# Patient Record
Sex: FEMALE | Race: WHITE | NOT HISPANIC OR LATINO | URBAN - METROPOLITAN AREA
[De-identification: names, ages, dates, MRNs, and addresses within clinical notes are randomized per-mention and may not be internally consistent; named-entity substitution may affect disease eponyms.]

---

## 2020-02-05 ENCOUNTER — OFFICE (OUTPATIENT)
Dept: URBAN - METROPOLITAN AREA CLINIC 33 | Facility: CLINIC | Age: 45
End: 2020-02-05

## 2020-02-05 VITALS
HEIGHT: 63 IN | WEIGHT: 119 LBS | TEMPERATURE: 97.7 F | DIASTOLIC BLOOD PRESSURE: 67 MMHG | SYSTOLIC BLOOD PRESSURE: 105 MMHG | HEART RATE: 73 BPM

## 2020-02-05 DIAGNOSIS — R74.8 ABNORMAL LEVELS OF OTHER SERUM ENZYMES: ICD-10-CM

## 2020-02-05 DIAGNOSIS — Z80.0 FAMILY HISTORY OF MALIGNANT NEOPLASM OF DIGESTIVE ORGANS: ICD-10-CM

## 2020-02-05 DIAGNOSIS — Z12.11 ENCOUNTER FOR SCREENING FOR MALIGNANT NEOPLASM OF COLON: ICD-10-CM

## 2020-02-05 DIAGNOSIS — Z83.71 FAMILY HISTORY OF COLONIC POLYPS: ICD-10-CM

## 2020-02-05 PROCEDURE — 99244 OFF/OP CNSLTJ NEW/EST MOD 40: CPT

## 2020-02-05 NOTE — SERVICEHPINOTES
FREDY KAPADIA   is a   44   year old    female who is being seen in consultation at the request of   [facility]   for elevated liver enzymes (AST and ALT). She was first noted to have elevated AST and ALT in 2011. She had an U/S 2 years ago which was normal. She has had prior negative hepatitis testing (2017). She has not had any autoimmune workup. Her most recent labwork in October 2019 revealed a slight increase in ALT to 71 (from 60 in 2017) and a bigger increase in AST to 82 (from 47 from 2017). She is wondering if more recent elevation was due to strenuous exercise which was several days prior to blood tests. She denies any alcohol use. She denies any needle sticks, blood transfusions, or risky behaviors for hepatitis in past two years. No medications or OTC supplements. No history of thyroid issues. She denies any abdominal pain, nausea, vomiting, diarrhea, constipation, melena, rectal bleeding, loss of appetite, weight loss, heartburn, dysphagia, fevers/chills/night sweats. She is also wondering about a colonoscopy given her age and family history of polyps (mother) as well as CRC in maternal grandfather.

## 2020-02-11 LAB
ALPHA-1-ANTITRYPSIN QN: 136 MG/DL (ref 83–199)
ANA SCREEN, IFA, W/REFL TITER AND PATTERN: ANA SCREEN, IFA: NEGATIVE
CBC (INCLUDES DIFF/PLT): ABSOLUTE BAND NEUTROPHILS: NORMAL CELLS/UL
CBC (INCLUDES DIFF/PLT): ABSOLUTE BASOPHILS: 28 CELLS/UL (ref 0–200)
CBC (INCLUDES DIFF/PLT): ABSOLUTE BLASTS: NORMAL CELLS/UL
CBC (INCLUDES DIFF/PLT): ABSOLUTE EOSINOPHILS: 72 CELLS/UL (ref 15–500)
CBC (INCLUDES DIFF/PLT): ABSOLUTE LYMPHOCYTES: 2305 CELLS/UL (ref 850–3900)
CBC (INCLUDES DIFF/PLT): ABSOLUTE METAMYELOCYTES: NORMAL CELLS/UL
CBC (INCLUDES DIFF/PLT): ABSOLUTE MONOCYTES: 369 CELLS/UL (ref 200–950)
CBC (INCLUDES DIFF/PLT): ABSOLUTE MYELOCYTES: NORMAL CELLS/UL
CBC (INCLUDES DIFF/PLT): ABSOLUTE NEUTROPHILS: 2728 CELLS/UL (ref 1500–7800)
CBC (INCLUDES DIFF/PLT): ABSOLUTE NUCLEATED RBC: NORMAL CELLS/UL
CBC (INCLUDES DIFF/PLT): ABSOLUTE PROMYELOCYTES: NORMAL CELLS/UL
CBC (INCLUDES DIFF/PLT): BAND NEUTROPHILS: NORMAL %
CBC (INCLUDES DIFF/PLT): BASOPHILS: 0.5 %
CBC (INCLUDES DIFF/PLT): BLASTS: NORMAL %
CBC (INCLUDES DIFF/PLT): COMMENT(S): NORMAL
CBC (INCLUDES DIFF/PLT): EOSINOPHILS: 1.3 %
CBC (INCLUDES DIFF/PLT): HEMATOCRIT: 39.7 % (ref 35–45)
CBC (INCLUDES DIFF/PLT): HEMOGLOBIN: 13.3 G/DL (ref 11.7–15.5)
CBC (INCLUDES DIFF/PLT): LYMPHOCYTES: 41.9 %
CBC (INCLUDES DIFF/PLT): MCH: 29 PG (ref 27–33)
CBC (INCLUDES DIFF/PLT): MCHC: 33.5 G/DL (ref 32–36)
CBC (INCLUDES DIFF/PLT): MCV: 86.7 FL (ref 80–100)
CBC (INCLUDES DIFF/PLT): METAMYELOCYTES: NORMAL %
CBC (INCLUDES DIFF/PLT): MONOCYTES: 6.7 %
CBC (INCLUDES DIFF/PLT): MPV: 13.1 FL — HIGH (ref 7.5–12.5)
CBC (INCLUDES DIFF/PLT): MYELOCYTES: NORMAL %
CBC (INCLUDES DIFF/PLT): NEUTROPHILS: 49.6 %
CBC (INCLUDES DIFF/PLT): NUCLEATED RBC: NORMAL /100 WBC
CBC (INCLUDES DIFF/PLT): PLATELET COUNT: 190 THOUSAND/UL (ref 140–400)
CBC (INCLUDES DIFF/PLT): PROMYELOCYTES: NORMAL %
CBC (INCLUDES DIFF/PLT): RDW: 12.2 % (ref 11–15)
CBC (INCLUDES DIFF/PLT): REACTIVE LYMPHOCYTES: NORMAL %
CBC (INCLUDES DIFF/PLT): RED BLOOD CELL COUNT: 4.58 MILLION/UL (ref 3.8–5.1)
CBC (INCLUDES DIFF/PLT): WHITE BLOOD CELL COUNT: 5.5 THOUSAND/UL (ref 3.8–10.8)
CELIAC DISEASE COMPREHENSIVE PANEL: IMMUNOGLOBULIN A: 130 MG/DL (ref 47–310)
CELIAC DISEASE COMPREHENSIVE PANEL: INTERPRETATION: (no result)
CELIAC DISEASE COMPREHENSIVE PANEL: TISSUE TRANSGLUTAMINASE AB, IGA: 1 U/ML
CERULOPLASMIN: 30 MG/DL (ref 18–53)
COMPREHENSIVE METABOLIC PANEL: ALBUMIN/GLOBULIN RATIO: 1.8 (CALC) (ref 1–2.5)
COMPREHENSIVE METABOLIC PANEL: ALBUMIN: 4.4 G/DL (ref 3.6–5.1)
COMPREHENSIVE METABOLIC PANEL: ALKALINE PHOSPHATASE: 67 U/L (ref 31–125)
COMPREHENSIVE METABOLIC PANEL: ALT: 42 U/L — HIGH (ref 6–29)
COMPREHENSIVE METABOLIC PANEL: AST: 33 U/L (ref 10–35)
COMPREHENSIVE METABOLIC PANEL: BILIRUBIN, TOTAL: 1.3 MG/DL — HIGH (ref 0.2–1.2)
COMPREHENSIVE METABOLIC PANEL: BUN/CREATININE RATIO: (no result) (CALC)
COMPREHENSIVE METABOLIC PANEL: CALCIUM: 9.4 MG/DL (ref 8.6–10.2)
COMPREHENSIVE METABOLIC PANEL: CARBON DIOXIDE: 25 MMOL/L (ref 20–32)
COMPREHENSIVE METABOLIC PANEL: CHLORIDE: 105 MMOL/L (ref 98–110)
COMPREHENSIVE METABOLIC PANEL: CREATININE: 0.8 MG/DL (ref 0.5–1.1)
COMPREHENSIVE METABOLIC PANEL: EGFR AFRICAN AMERICAN: 102 ML/MIN/1.73M2 (ref 60–?)
COMPREHENSIVE METABOLIC PANEL: EGFR NON-AFR. AMERICAN: 88 ML/MIN/1.73M2 (ref 60–?)
COMPREHENSIVE METABOLIC PANEL: GLOBULIN: 2.5 G/DL (CALC) (ref 1.9–3.7)
COMPREHENSIVE METABOLIC PANEL: GLUCOSE: 83 MG/DL (ref 65–139)
COMPREHENSIVE METABOLIC PANEL: POTASSIUM: 4.1 MMOL/L (ref 3.5–5.3)
COMPREHENSIVE METABOLIC PANEL: PROTEIN, TOTAL: 6.9 G/DL (ref 6.1–8.1)
COMPREHENSIVE METABOLIC PANEL: SODIUM: 138 MMOL/L (ref 135–146)
COMPREHENSIVE METABOLIC PANEL: UREA NITROGEN (BUN): 9 MG/DL (ref 7–25)
FERRITIN: 22 NG/ML (ref 16–232)
GGT: 30 U/L (ref 3–55)
IRON AND TOTAL IRON BINDING CAPACITY: % SATURATION: 41 % (CALC) (ref 16–45)
IRON AND TOTAL IRON BINDING CAPACITY: IRON BINDING CAPACITY: 366 MCG/DL (CALC) (ref 250–450)
IRON AND TOTAL IRON BINDING CAPACITY: IRON, TOTAL: 149 MCG/DL (ref 40–190)
LIVER KIDNEY MICROSOME (LKM-1) AB (IGG): LKM-1 ANTIBODY (IGG): <=20 U
MITOCHONDRIAL ANTIBODY W/REFL TITER: MITOCHONDRIAL AB SCREEN: NEGATIVE
PARTIAL THROMBOPLASTIN TIME, ACTIVATED: 29 SEC (ref 22–34)
PROTHROMBIN TIME-INR: INR: 1
PROTHROMBIN TIME-INR: PT: 10.5 SEC (ref 9–11.5)
SMOOTH MUSCLE AB W/REFL TITER: SMOOTH MUSCLE AB SCREEN: NEGATIVE
SOLUBLE LIVER ANTIGEN (SLA) AUTOANTIBODY: <20.1 U
T4, FREE: 1.1 NG/DL (ref 0.8–1.8)

## 2020-02-27 ENCOUNTER — OFFICE (OUTPATIENT)
Dept: URBAN - METROPOLITAN AREA PATHOLOGY 18 | Facility: PATHOLOGY | Age: 45
End: 2020-02-27
Payer: COMMERCIAL

## 2020-02-27 ENCOUNTER — OFFICE (OUTPATIENT)
Dept: URBAN - METROPOLITAN AREA CLINIC 30 | Facility: CLINIC | Age: 45
End: 2020-02-27
Payer: COMMERCIAL

## 2020-02-27 DIAGNOSIS — Z83.71 FAMILY HISTORY OF COLONIC POLYPS: ICD-10-CM

## 2020-02-27 DIAGNOSIS — Z12.11 ENCOUNTER FOR SCREENING FOR MALIGNANT NEOPLASM OF COLON: ICD-10-CM

## 2020-02-27 DIAGNOSIS — D12.8 BENIGN NEOPLASM OF RECTUM: ICD-10-CM

## 2020-02-27 PROBLEM — K62.1 RECTAL POLYP: Status: ACTIVE | Noted: 2020-02-27

## 2020-02-27 PROBLEM — D12.0 BENIGN NEOPLASM OF CECUM: Status: ACTIVE | Noted: 2020-02-27

## 2020-02-27 PROCEDURE — 45380 COLONOSCOPY AND BIOPSY: CPT | Mod: PT | Performed by: INTERNAL MEDICINE

## 2020-02-27 PROCEDURE — 88305 TISSUE EXAM BY PATHOLOGIST: CPT | Performed by: PATHOLOGY
